# Patient Record
Sex: FEMALE | Race: WHITE
[De-identification: names, ages, dates, MRNs, and addresses within clinical notes are randomized per-mention and may not be internally consistent; named-entity substitution may affect disease eponyms.]

---

## 2017-06-03 NOTE — NUR
PT CAME IN FOR NAUSEA, VOMITING, DIARRHEA, LLQ AND L FLANK PAIN x 1 DAY. DENIES 
FEVER. NOTED GEN WEAKNESS. AAOX3. MD AT BS FOR EVAL. SAFETY AND COMFORT 
MEASURES PROVIDED. WILL MONITOR.

## 2018-08-07 ENCOUNTER — HOSPITAL ENCOUNTER (OUTPATIENT)
Dept: HOSPITAL 91 - GIL | Age: 53
Discharge: HOME | End: 2018-08-07
Payer: COMMERCIAL

## 2018-08-07 ENCOUNTER — HOSPITAL ENCOUNTER (OUTPATIENT)
Age: 53
Discharge: HOME | End: 2018-08-07

## 2018-08-07 DIAGNOSIS — Z12.11: Primary | ICD-10-CM

## 2018-08-07 DIAGNOSIS — K64.4: ICD-10-CM

## 2018-08-07 DIAGNOSIS — K29.50: ICD-10-CM

## 2018-08-07 DIAGNOSIS — K57.90: ICD-10-CM

## 2018-08-07 DIAGNOSIS — K29.80: ICD-10-CM

## 2018-08-07 PROCEDURE — 43239 EGD BIOPSY SINGLE/MULTIPLE: CPT

## 2018-08-07 PROCEDURE — 88305 TISSUE EXAM BY PATHOLOGIST: CPT

## 2018-08-07 PROCEDURE — 82962 GLUCOSE BLOOD TEST: CPT

## 2018-08-07 PROCEDURE — 88312 SPECIAL STAINS GROUP 1: CPT

## 2019-05-21 NOTE — NUR
PT BIBSELF C/O MIDSTERNAL CHEST PAIN X4HR. PT ALSO C/O HEADACHE AND NAUSEA. PT 
AAOX4. RESPIRATIONS EVEN AND UNLABORED. SKIN INTACT. VITAL SIGNS STABLE. NO 
ACUTE DISTRESS NOTED AT THIS TIME. IN GOWN AND ON MONITOR, WILL CONTINUE TO 
MONITOR

## 2019-05-21 NOTE — NUR
Patient discharged to home in stable condition. Written and verbal after care 
instructions given. Patient verbalizes understanding of instruction. IV 
removed. Catheter intact and site benign. Pressure and 4x4 applied to site. No 
bleeding noted.Pt ambulatory with a steady gait

## 2019-05-21 NOTE — NUR
IV INITIATED R AC 20G. LABS DRAWN FROM SITE AND SENT TO LAB. IV INTACT AND 
PATENT, PLACED ON SALINE LOCK

## 2019-07-23 ENCOUNTER — HOSPITAL ENCOUNTER (EMERGENCY)
Dept: HOSPITAL 54 - ER | Age: 54
Discharge: HOME | End: 2019-07-23
Payer: COMMERCIAL

## 2019-07-23 VITALS — DIASTOLIC BLOOD PRESSURE: 66 MMHG | SYSTOLIC BLOOD PRESSURE: 117 MMHG

## 2019-07-23 VITALS — WEIGHT: 163 LBS | HEIGHT: 65 IN | BODY MASS INDEX: 27.16 KG/M2

## 2019-07-23 DIAGNOSIS — Z60.2: ICD-10-CM

## 2019-07-23 DIAGNOSIS — E78.5: ICD-10-CM

## 2019-07-23 DIAGNOSIS — E11.9: ICD-10-CM

## 2019-07-23 DIAGNOSIS — L50.9: Primary | ICD-10-CM

## 2019-07-23 SDOH — SOCIAL STABILITY - SOCIAL INSECURITY: PROBLEMS RELATED TO LIVING ALONE: Z60.2

## 2020-02-04 ENCOUNTER — HOSPITAL ENCOUNTER (EMERGENCY)
Dept: HOSPITAL 54 - ER | Age: 55
Discharge: HOME | End: 2020-02-04
Payer: COMMERCIAL

## 2020-02-04 VITALS — BODY MASS INDEX: 26.66 KG/M2 | HEIGHT: 65 IN | WEIGHT: 160 LBS

## 2020-02-04 VITALS — DIASTOLIC BLOOD PRESSURE: 85 MMHG | SYSTOLIC BLOOD PRESSURE: 136 MMHG

## 2020-02-04 DIAGNOSIS — L50.0: Primary | ICD-10-CM

## 2020-02-04 DIAGNOSIS — Z60.2: ICD-10-CM

## 2020-02-04 DIAGNOSIS — E11.9: ICD-10-CM

## 2020-02-04 DIAGNOSIS — E03.9: ICD-10-CM

## 2020-02-04 DIAGNOSIS — R22.0: ICD-10-CM

## 2020-02-04 PROCEDURE — 96374 THER/PROPH/DIAG INJ IV PUSH: CPT

## 2020-02-04 PROCEDURE — 99283 EMERGENCY DEPT VISIT LOW MDM: CPT

## 2020-02-04 PROCEDURE — 96375 TX/PRO/DX INJ NEW DRUG ADDON: CPT

## 2020-02-04 SDOH — SOCIAL STABILITY - SOCIAL INSECURITY: PROBLEMS RELATED TO LIVING ALONE: Z60.2

## 2021-12-16 ENCOUNTER — HOSPITAL ENCOUNTER (EMERGENCY)
Dept: HOSPITAL 54 - ER | Age: 56
Discharge: HOME | End: 2021-12-16
Payer: COMMERCIAL

## 2021-12-16 VITALS — HEIGHT: 64 IN | WEIGHT: 140 LBS | BODY MASS INDEX: 23.9 KG/M2

## 2021-12-16 VITALS — SYSTOLIC BLOOD PRESSURE: 125 MMHG | DIASTOLIC BLOOD PRESSURE: 67 MMHG

## 2021-12-16 DIAGNOSIS — E78.5: ICD-10-CM

## 2021-12-16 DIAGNOSIS — R07.89: ICD-10-CM

## 2021-12-16 DIAGNOSIS — M54.9: Primary | ICD-10-CM

## 2021-12-16 LAB
BASOPHILS # BLD AUTO: 0.1 K/UL (ref 0–0.2)
BASOPHILS NFR BLD AUTO: 0.7 % (ref 0–2)
BUN SERPL-MCNC: 17 MG/DL (ref 7–18)
CALCIUM SERPL-MCNC: 9.1 MG/DL (ref 8.5–10.1)
CHLORIDE SERPL-SCNC: 103 MMOL/L (ref 98–107)
CO2 SERPL-SCNC: 26 MMOL/L (ref 21–32)
CREAT SERPL-MCNC: 0.7 MG/DL (ref 0.6–1.3)
EOSINOPHIL NFR BLD AUTO: 3 % (ref 0–6)
GLUCOSE SERPL-MCNC: 115 MG/DL (ref 74–106)
HCT VFR BLD AUTO: 39 % (ref 33–45)
HGB BLD-MCNC: 12.6 G/DL (ref 11.5–14.8)
LYMPHOCYTES NFR BLD AUTO: 2.2 K/UL (ref 0.8–4.8)
LYMPHOCYTES NFR BLD AUTO: 28.7 % (ref 20–44)
MCHC RBC AUTO-ENTMCNC: 33 G/DL (ref 31–36)
MCV RBC AUTO: 91 FL (ref 82–100)
MONOCYTES NFR BLD AUTO: 0.4 K/UL (ref 0.1–1.3)
MONOCYTES NFR BLD AUTO: 5.7 % (ref 2–12)
NEUTROPHILS # BLD AUTO: 4.7 K/UL (ref 1.8–8.9)
NEUTROPHILS NFR BLD AUTO: 61.9 % (ref 43–81)
PLATELET # BLD AUTO: 385 K/UL (ref 150–450)
POTASSIUM SERPL-SCNC: 3.8 MMOL/L (ref 3.5–5.1)
RBC # BLD AUTO: 4.23 MIL/UL (ref 4–5.2)
SODIUM SERPL-SCNC: 137 MMOL/L (ref 136–145)
WBC NRBC COR # BLD AUTO: 7.5 K/UL (ref 4.3–11)

## 2021-12-16 PROCEDURE — 36415 COLL VENOUS BLD VENIPUNCTURE: CPT

## 2021-12-16 PROCEDURE — 80048 BASIC METABOLIC PNL TOTAL CA: CPT

## 2021-12-16 PROCEDURE — 85378 FIBRIN DEGRADE SEMIQUANT: CPT

## 2021-12-16 PROCEDURE — 85025 COMPLETE CBC W/AUTO DIFF WBC: CPT

## 2021-12-16 PROCEDURE — 93005 ELECTROCARDIOGRAM TRACING: CPT

## 2021-12-16 PROCEDURE — 99285 EMERGENCY DEPT VISIT HI MDM: CPT

## 2021-12-16 PROCEDURE — 71045 X-RAY EXAM CHEST 1 VIEW: CPT

## 2021-12-16 PROCEDURE — 84484 ASSAY OF TROPONIN QUANT: CPT

## 2021-12-16 PROCEDURE — 71275 CT ANGIOGRAPHY CHEST: CPT

## 2021-12-16 RX ADMIN — CYCLOBENZAPRINE HYDROCHLORIDE ONE MG: 10 TABLET, FILM COATED ORAL at 08:42

## 2021-12-16 RX ADMIN — OXYCODONE HYDROCHLORIDE AND ACETAMINOPHEN ONE UDTAB: 5; 325 TABLET ORAL at 08:43

## 2021-12-16 RX ADMIN — ASPIRIN ONE MG: 325 TABLET, FILM COATED ORAL at 08:35

## 2021-12-16 NOTE — NUR
PT CAME TO ER C/O UPPER BACK PAIN X 1 MONTH S/P FALLING LAST MONTH. PAIN RATED 
8/10. AAOX4, AMBULATORY, BREATHING EVEN AND UNLABORED. ASSISTED TO ER BED 9, 
BLANKET GIVEN.

## 2022-05-25 ENCOUNTER — HOSPITAL ENCOUNTER (EMERGENCY)
Dept: HOSPITAL 54 - ER | Age: 57
LOS: 1 days | Discharge: LEFT BEFORE DISCHARGE | End: 2022-05-26
Payer: COMMERCIAL

## 2022-05-25 DIAGNOSIS — Z53.21: Primary | ICD-10-CM
